# Patient Record
Sex: MALE | Race: WHITE | NOT HISPANIC OR LATINO | Employment: FULL TIME | URBAN - METROPOLITAN AREA
[De-identification: names, ages, dates, MRNs, and addresses within clinical notes are randomized per-mention and may not be internally consistent; named-entity substitution may affect disease eponyms.]

---

## 2019-01-07 ENCOUNTER — HOSPITAL ENCOUNTER (EMERGENCY)
Facility: CLINIC | Age: 49
Discharge: HOME | End: 2019-01-07
Attending: EMERGENCY MEDICINE | Admitting: EMERGENCY MEDICINE
Payer: COMMERCIAL

## 2019-01-07 VITALS
HEIGHT: 71 IN | DIASTOLIC BLOOD PRESSURE: 80 MMHG | OXYGEN SATURATION: 99 % | BODY MASS INDEX: 25.2 KG/M2 | RESPIRATION RATE: 12 BRPM | HEART RATE: 77 BPM | WEIGHT: 180 LBS | SYSTOLIC BLOOD PRESSURE: 121 MMHG | TEMPERATURE: 98.3 F

## 2019-01-07 DIAGNOSIS — R00.0 TACHYCARDIA: ICD-10-CM

## 2019-01-07 DIAGNOSIS — R42 DIZZINESS: ICD-10-CM

## 2019-01-07 LAB
ANION GAP SERPL CALCULATED.3IONS-SCNC: 8 MMOL/L (ref 3–14)
BASOPHILS # BLD AUTO: 0 10E9/L (ref 0–0.2)
BASOPHILS NFR BLD AUTO: 0.3 %
BUN SERPL-MCNC: 20 MG/DL (ref 7–30)
CALCIUM SERPL-MCNC: 8.6 MG/DL (ref 8.5–10.1)
CHLORIDE SERPL-SCNC: 108 MMOL/L (ref 94–109)
CO2 SERPL-SCNC: 27 MMOL/L (ref 20–32)
CREAT SERPL-MCNC: 0.91 MG/DL (ref 0.66–1.25)
DIFFERENTIAL METHOD BLD: ABNORMAL
EOSINOPHIL # BLD AUTO: 0.1 10E9/L (ref 0–0.7)
EOSINOPHIL NFR BLD AUTO: 1.2 %
ERYTHROCYTE [DISTWIDTH] IN BLOOD BY AUTOMATED COUNT: 12.3 % (ref 10–15)
GFR SERPL CREATININE-BSD FRML MDRD: >90 ML/MIN/{1.73_M2}
GLUCOSE SERPL-MCNC: 105 MG/DL (ref 70–99)
HCT VFR BLD AUTO: 33.6 % (ref 40–53)
HGB BLD-MCNC: 11 G/DL (ref 13.3–17.7)
IMM GRANULOCYTES # BLD: 0 10E9/L (ref 0–0.4)
IMM GRANULOCYTES NFR BLD: 0.3 %
INTERPRETATION ECG - MUSE: NORMAL
LYMPHOCYTES # BLD AUTO: 0.7 10E9/L (ref 0.8–5.3)
LYMPHOCYTES NFR BLD AUTO: 12.3 %
MCH RBC QN AUTO: 31.2 PG (ref 26.5–33)
MCHC RBC AUTO-ENTMCNC: 32.7 G/DL (ref 31.5–36.5)
MCV RBC AUTO: 95 FL (ref 78–100)
MONOCYTES # BLD AUTO: 0.4 10E9/L (ref 0–1.3)
MONOCYTES NFR BLD AUTO: 6.6 %
NEUTROPHILS # BLD AUTO: 4.6 10E9/L (ref 1.6–8.3)
NEUTROPHILS NFR BLD AUTO: 79.3 %
NRBC # BLD AUTO: 0 10*3/UL
NRBC BLD AUTO-RTO: 0 /100
PLATELET # BLD AUTO: 201 10E9/L (ref 150–450)
POTASSIUM SERPL-SCNC: 4.1 MMOL/L (ref 3.4–5.3)
RBC # BLD AUTO: 3.53 10E12/L (ref 4.4–5.9)
SODIUM SERPL-SCNC: 143 MMOL/L (ref 133–144)
TROPONIN I SERPL-MCNC: <0.015 UG/L (ref 0–0.04)
WBC # BLD AUTO: 5.8 10E9/L (ref 4–11)

## 2019-01-07 PROCEDURE — 96360 HYDRATION IV INFUSION INIT: CPT

## 2019-01-07 PROCEDURE — 96361 HYDRATE IV INFUSION ADD-ON: CPT

## 2019-01-07 PROCEDURE — 99284 EMERGENCY DEPT VISIT MOD MDM: CPT | Mod: 25

## 2019-01-07 PROCEDURE — 84484 ASSAY OF TROPONIN QUANT: CPT | Performed by: EMERGENCY MEDICINE

## 2019-01-07 PROCEDURE — 85025 COMPLETE CBC W/AUTO DIFF WBC: CPT | Performed by: EMERGENCY MEDICINE

## 2019-01-07 PROCEDURE — 80048 BASIC METABOLIC PNL TOTAL CA: CPT | Performed by: EMERGENCY MEDICINE

## 2019-01-07 PROCEDURE — 93005 ELECTROCARDIOGRAM TRACING: CPT

## 2019-01-07 PROCEDURE — 25000128 H RX IP 250 OP 636: Performed by: EMERGENCY MEDICINE

## 2019-01-07 RX ORDER — SODIUM CHLORIDE 9 MG/ML
1000 INJECTION, SOLUTION INTRAVENOUS CONTINUOUS
Status: DISCONTINUED | OUTPATIENT
Start: 2019-01-07 | End: 2019-01-07 | Stop reason: HOSPADM

## 2019-01-07 RX ADMIN — SODIUM CHLORIDE 1000 ML: 9 INJECTION, SOLUTION INTRAVENOUS at 16:27

## 2019-01-07 ASSESSMENT — ENCOUNTER SYMPTOMS: PALPITATIONS: 1

## 2019-01-07 ASSESSMENT — MIFFLIN-ST. JEOR: SCORE: 1718.6

## 2019-01-07 NOTE — ED AVS SNAPSHOT
[facility] Emergency Department  [address]  [facility] [address]  Phone:  [phone]  Fax:  [phone]                                    Sherif Moraes   MRN: [id number]    Department:  [facility] Emergency Department   Date of Visit:  1/7/2019           After Visit Summary Signature Page    I have received my discharge instructions, and my questions have been answered. I have discussed any challenges I see with this plan with the nurse or doctor.    ..........................................................................................................................................  Patient/Patient Representative Signature      ..........................................................................................................................................  Patient Representative Print Name and Relationship to Patient    ..................................................               ................................................  Date                                   Time    ..........................................................................................................................................  Reviewed by Signature/Title    ...................................................              ..............................................  Date                                               Time          22EPIC Rev 08/18

## 2019-01-07 NOTE — ED PROVIDER NOTES
"  History     Chief Complaint:  Tachycardia      HPI   Sherif Moraes is a 48 year old male who presents to the emergency department today for evaluation of tachycardia. The patient reports four hours prior to arrival he went for a run, and noticed at the end of the run that he felt extreme exhaustion that was different from usual. He felt his heart was racing, and some anxiety. He did try eating yogurt, and drinking water. His heart rate stayed around 110 for quite a while, and has since gone down in the past 1/2 hour. Due to these symptoms he presented to the emergency department today. Upon arrival he says he feels out of it. He reports the same situation and experience 8 years ago, and he presented to the emergency department and was found to have low potassium. He reports he did travel to the [place] 1 month prior.     Allergies:  No Known Drug Allergies        Medications:    Cyclobenzaprine HCl (FLEXERIL PO)  diazepam (VALIUM) 5 MG tablet  HydrOXYzine Pamoate (VISTARIL PO)  methocarbamol (ROBAXIN) 500 MG tablet       Past Medical History:    History reviewed. No pertinent past medical history.       Past Surgical History:    History reviewed. No pertinent past surgical history.       Family History:    History reviewed. No pertinent family history.        Social History:  The patient was accompanied to the ED by wife.  Smoking Status: Never Smoker  Smokeless Tobacco: Never Used  Alcohol Use: No   Marital Status:   [2]       Review of Systems   Cardiovascular: Positive for palpitations.   All other systems reviewed and are negative.        Physical Exam   First Vitals:  BP: (!) 133/93  Pulse: 96  Heart Rate: 96  Temp: 98.3  F (36.8  C)  Resp: 16  Height: 180.3 cm (5' 11\")  Weight: 81.6 kg (180 lb)  SpO2: 100 %      Physical Exam  General: The patient is alert, in no respiratory distress.    HENT: Mucous membranes moist.    Cardiovascular: Regular rate and rhythm. Good pulses in all four " extremities. Normal capillary refill and skin turgor.     Respiratory: Lungs are clear. No nasal flaring. No retractions. No wheezing, no crackles.    Gastrointestinal: Abdomen soft. No guarding, no rebound. No palpable hernias.     Musculoskeletal: No gross deformity.     Skin: No rashes or petechiae.     Neurologic: The patient is alert and oriented x3. GCS 15. No testable cranial nerve deficit. Follows commands with clear and appropriate speech. Gives appropriate answers. Good strength in all extremities. No gross neurologic deficit. Gross sensation intact. Pupils are round and reactive. No meningismus.     Lymphatic: No cervical adenopathy. No lower extremity swelling.    Psychiatric: The patient is non-tearful.   Emergency Department Course     ECG:  Indication: tachycardia  Completed at 1429.  Read at 1617.   Normal sinus rhythm. Normal ECG.  Rate 98 bpm. NC interval 170. QRS duration 82. QT/QTc 344/439. P-R-T axes 59 37 32.     Laboratory:  Laboratory findings were communicated with the patient who voiced understanding of the findings.    CBC: WBC 5.8, HGB 11.0 (L),    BMP: Glucose 105 (H) o/w WNL (Creatinine 0.91)   Troponin (Collected 1550): <0.015         Interventions:  1627 NS Bolus 1,000mL IV        Emergency Department Course:  Nursing notes and vitals reviewed.  1617: I performed an exam of the patient as documented above.   IV was inserted and blood was drawn for laboratory testing, results above.   EKG obtained in the ED, see results above.    1745 Patient rechecked and updated.    Findings and plan explained to the Patient. Patient discharged home with instructions regarding supportive care, medications, and reasons to return. The importance of close follow-up was reviewed.   I personally reviewed the laboratory  results with the Patient and answered all related questions prior to  discharge.   Impression & Plan      Medical Decision Making:  Sherif Moraes is a 48 year old male who reports  with starting his workout back up again he noticed his heart was beating fast. He thinks it was in the 110 range. This doesn't sound like SVT. He was lightheaded and had a history of anxiety. I think this is multifactorial including partial dehydration. Id did note on labs his hemoglobin slight low of 11, but hasn't had any bleeding or bloody stools. He said it has been this low in the past. I did give him a bag of fluid. He was feeling much better, and was discharged home with instruction to follow up with primary care. I did discuss the need to have a repeat hemoglobin, but I don't feel like he has a GI bleed or other symptoms. His symptoms resolved while in emergency department.     Diagnosis:    ICD-10-CM    1. Tachycardia R00.0    2. Dizziness R42        Disposition:  Discharged to home.     Scribe Disclosure:  I, Ana Graves, am serving as a scribe at 4:18 PM on 1/7/2019 to document services personally performed by Zhao Rutledge MD based on my observations and the provider's statements to me.    Ana Graves  1/7/2019   [facility] EMERGENCY DEPARTMENT       Zhao Rutledge MD  01/10/19 0615

## 2019-01-07 NOTE — ED TRIAGE NOTES
Patient reports he ran 2.5 miles this afternoon at about 1230. His heart rate has stayed up since then, 110 in triage. His normal heart rate is 56-60. He is lightheaded.

## 2019-01-07 NOTE — DISCHARGE INSTRUCTIONS
Discharge Instructions  Palpitations    Palpitations are an unusual awareness of your heartbeat. People often describe this as the heart skipping, fluttering, racing, irregular, or pounding. At this time, your doctor has found no signs that your palpitations are due to a serious or life-threatening condition. However, sometimes there is a serious problem that does not show up right away. It is important that you follow up with your doctor within 1 week, or as directed by your doctor today, to check for other serious problems. You may need more blood tests, a stress test, heart monitoring, or other tests.    Palpitations can be caused by caffeine, cigarettes, diet pills, energy drinks or supplements, other stimulants, and medications and street drugs. They can also be caused by anxiety, hormone conditions such as high thyroid, and other medical conditions. Sometimes they are a sign of abnormal rhythm in the heart, so you may need your heart checked.    Return to the Emergency Department if:  You get chest pain or tightness.  You are short of breath.  You get very weak or tired.  You pass out or faint.  Your heart rate is over 120 beats per minute for more than 10 minutes while you are resting.  You have any new symptoms, like fever, cough, numb legs, or you cough up blood.  You have anything else that worries you.    What can I do to help myself?  Fill any prescriptions the doctor gave you and take them right away.   Follow your doctor?s instructions about the prescription medicines you are on. Sometimes the doctor may tell you to stop taking a medicine or change the dose.  If you smoke, this may be a good time to quit! The less you can smoke, the better.  Do not use energy drinks, diet pills, or stimulants. Limit your use of caffeine.    Follow up with your doctor:  Within 1 week, or sooner if instructed.  If you keep having palpitations.  If you need help to quit smoking.  If you were given a prescription for  medicine here today, be sure to read all of the information (including the package insert) that comes with your prescription.  This will include important information about the medicine, its side effects, and any warnings that you need to know about.  The pharmacist who fills the prescription can provide more information and answer questions you may have about the medicine.  If you have questions or concerns that the pharmacist cannot address, please call or return to the Emergency Department.         Opioid Medication Information    Pain medications are among the most commonly prescribed medicines, so we are including this information for all our patients. If you did not receive pain medication or get a prescription for pain medicine, you can ignore it.     You may have been given a prescription for an opioid (narcotic) pain medicine and/or have received a pain medicine while here in the Emergency Department. These medicines can make you drowsy or impaired. You must not drive, operate dangerous equipment, or engage in any other dangerous activities while taking these medications. If you drive while taking these medications, you could be arrested for DUI, or driving under the influence. Do not drink any alcohol while you are taking these medications.     Opioid pain medications can cause addiction. If you have a history of chemical dependency of any type, you are at a higher risk of becoming addicted to pain medications.  Only take these prescribed medications to treat your pain when all other options have been tried. Take it for as short a time and as few doses as possible. Store your pain pills in a secure place, as they are frequently stolen and provide a dangerous opportunity for children or visitors in your house to start abusing these powerful medications. We will not replace any lost or stolen medicine.  As soon as your pain is better, you should flush all your remaining medication.     Many prescription pain  medications contain Tylenol  (acetaminophen), including Vicodin , Tylenol #3 , Norco , Lortab , and Percocet .  You should not take any extra pills of Tylenol  if you are using these prescription medications or you can get very sick.  Do not ever take more than 3000 mg of acetaminophen in any 24 hour period.    All opioids tend to cause constipation. Drink plenty of water and eat foods that have a lot of fiber, such as fruits, vegetables, prune juice, apple juice and high fiber cereal.  Take a laxative if you don?t move your bowels at least every other day. Miralax , Milk of Magnesia, Colace , or Senna  can be used to keep you regular.      Remember that you can always come back to the Emergency Department if you are not able to see your regular doctor in the amount of time listed above, if you get any new symptoms, or if there is anything that worries you.

## 2021-12-02 ENCOUNTER — APPOINTMENT (OUTPATIENT)
Dept: GENERAL RADIOLOGY | Facility: CLINIC | Age: 51
End: 2021-12-02
Attending: EMERGENCY MEDICINE
Payer: COMMERCIAL

## 2021-12-02 ENCOUNTER — HOSPITAL ENCOUNTER (EMERGENCY)
Facility: CLINIC | Age: 51
Discharge: HOME | End: 2021-12-02
Attending: EMERGENCY MEDICINE | Admitting: EMERGENCY MEDICINE
Payer: COMMERCIAL

## 2021-12-02 VITALS
WEIGHT: 180 LBS | OXYGEN SATURATION: 97 % | DIASTOLIC BLOOD PRESSURE: 65 MMHG | HEART RATE: 94 BPM | TEMPERATURE: 98.9 F | SYSTOLIC BLOOD PRESSURE: 124 MMHG | BODY MASS INDEX: 25.1 KG/M2 | RESPIRATION RATE: 18 BRPM

## 2021-12-02 DIAGNOSIS — U07.1 INFECTION DUE TO 2019 NOVEL CORONAVIRUS: ICD-10-CM

## 2021-12-02 LAB
ANION GAP SERPL CALCULATED.3IONS-SCNC: 6 MMOL/L (ref 3–14)
BASOPHILS # BLD AUTO: 0 10E3/UL (ref 0–0.2)
BASOPHILS NFR BLD AUTO: 1 %
BUN SERPL-MCNC: 11 MG/DL (ref 7–30)
CALCIUM SERPL-MCNC: 8.8 MG/DL (ref 8.5–10.1)
CHLORIDE BLD-SCNC: 106 MMOL/L (ref 94–109)
CO2 SERPL-SCNC: 26 MMOL/L (ref 20–32)
CREAT SERPL-MCNC: 0.95 MG/DL (ref 0.66–1.25)
EOSINOPHIL # BLD AUTO: 0 10E3/UL (ref 0–0.7)
EOSINOPHIL NFR BLD AUTO: 0 %
ERYTHROCYTE [DISTWIDTH] IN BLOOD BY AUTOMATED COUNT: 13.1 % (ref 10–15)
GFR SERPL CREATININE-BSD FRML MDRD: >90 ML/MIN/1.73M2
GLUCOSE BLD-MCNC: 103 MG/DL (ref 70–99)
HCT VFR BLD AUTO: 40.7 % (ref 40–53)
HGB BLD-MCNC: 13.4 G/DL (ref 13.3–17.7)
IMM GRANULOCYTES # BLD: 0 10E3/UL
IMM GRANULOCYTES NFR BLD: 0 %
LYMPHOCYTES # BLD AUTO: 0.7 10E3/UL (ref 0.8–5.3)
LYMPHOCYTES NFR BLD AUTO: 17 %
MCH RBC QN AUTO: 31.2 PG (ref 26.5–33)
MCHC RBC AUTO-ENTMCNC: 32.9 G/DL (ref 31.5–36.5)
MCV RBC AUTO: 95 FL (ref 78–100)
MONOCYTES # BLD AUTO: 0.7 10E3/UL (ref 0–1.3)
MONOCYTES NFR BLD AUTO: 19 %
NEUTROPHILS # BLD AUTO: 2.5 10E3/UL (ref 1.6–8.3)
NEUTROPHILS NFR BLD AUTO: 63 %
NRBC # BLD AUTO: 0 10E3/UL
NRBC BLD AUTO-RTO: 0 /100
PLATELET # BLD AUTO: 207 10E3/UL (ref 150–450)
POTASSIUM BLD-SCNC: 3.7 MMOL/L (ref 3.4–5.3)
RBC # BLD AUTO: 4.29 10E6/UL (ref 4.4–5.9)
SARS-COV-2 RNA RESP QL NAA+PROBE: POSITIVE
SODIUM SERPL-SCNC: 138 MMOL/L (ref 133–144)
TROPONIN I SERPL HS-MCNC: 25 NG/L
WBC # BLD AUTO: 4 10E3/UL (ref 4–11)

## 2021-12-02 PROCEDURE — 80048 BASIC METABOLIC PNL TOTAL CA: CPT | Performed by: EMERGENCY MEDICINE

## 2021-12-02 PROCEDURE — 71045 X-RAY EXAM CHEST 1 VIEW: CPT

## 2021-12-02 PROCEDURE — 85025 COMPLETE CBC W/AUTO DIFF WBC: CPT | Performed by: EMERGENCY MEDICINE

## 2021-12-02 PROCEDURE — 93005 ELECTROCARDIOGRAM TRACING: CPT

## 2021-12-02 PROCEDURE — 84484 ASSAY OF TROPONIN QUANT: CPT | Performed by: EMERGENCY MEDICINE

## 2021-12-02 PROCEDURE — 87635 SARS-COV-2 COVID-19 AMP PRB: CPT | Performed by: EMERGENCY MEDICINE

## 2021-12-02 PROCEDURE — 99285 EMERGENCY DEPT VISIT HI MDM: CPT

## 2021-12-02 PROCEDURE — 36415 COLL VENOUS BLD VENIPUNCTURE: CPT | Performed by: EMERGENCY MEDICINE

## 2021-12-02 PROCEDURE — C9803 HOPD COVID-19 SPEC COLLECT: HCPCS

## 2021-12-03 LAB
ATRIAL RATE - MUSE: 81 BPM
DIASTOLIC BLOOD PRESSURE - MUSE: NORMAL MMHG
INTERPRETATION ECG - MUSE: NORMAL
P AXIS - MUSE: 51 DEGREES
PR INTERVAL - MUSE: 160 MS
QRS DURATION - MUSE: 82 MS
QT - MUSE: 370 MS
QTC - MUSE: 429 MS
R AXIS - MUSE: 7 DEGREES
SYSTOLIC BLOOD PRESSURE - MUSE: NORMAL MMHG
T AXIS - MUSE: 7 DEGREES
VENTRICULAR RATE- MUSE: 81 BPM

## 2021-12-03 ASSESSMENT — ENCOUNTER SYMPTOMS
RHINORRHEA: 0
ACTIVITY CHANGE: 1
NAUSEA: 0
FEVER: 1
CONFUSION: 0
SHORTNESS OF BREATH: 0
FATIGUE: 1
DYSURIA: 0
VOMITING: 0
MYALGIAS: 1
ABDOMINAL PAIN: 0
PALPITATIONS: 0
COUGH: 1
APPETITE CHANGE: 1

## 2021-12-03 NOTE — ED PROVIDER NOTES
History     Chief Complaint:  Chest Pain and Covid Concern      HPI   Sherif Moraes is a 51 year old male who presents with 5 days of body aches, headaches, low-grade fevers, fatigue and malaise as well as loss of his sense of taste and smell.  The patient denies any significant nausea vomiting or diarrhea.  No rash.  He notes a mild cough but no significant shortness of breath.  He notes that tonight he developed a mild chest discomfort over the right anterolateral aspect of the chest wall which has now resolved.  He reports that he took a home COVID test 2 days after the onset of his symptoms but this was negative.  He has been using Tylenol and ibuprofen as needed for symptom control.  He denies any other symptoms at this time.    Review of Systems   Constitutional: Positive for activity change, appetite change, fatigue and fever.   HENT: Negative for congestion and rhinorrhea.    Respiratory: Positive for cough. Negative for shortness of breath.    Cardiovascular: Positive for chest pain. Negative for palpitations and leg swelling.   Gastrointestinal: Negative for abdominal pain, nausea and vomiting.   Genitourinary: Negative for dysuria.   Musculoskeletal: Positive for myalgias.   Skin: Negative for rash.   Neurological: Negative for syncope.   Psychiatric/Behavioral: Negative for confusion.   All other systems reviewed and are negative.        Allergies:  No Known Allergies      Medications:    Cyclobenzaprine   diazepam   Hydroxyzine Pamoate   methocarbamol         Past Medical History:    Epididymitis  Nondisplaced fracture of middle phalanx of left lesser toe  Sebaceous cyst  Hyperkalemia  Acute upper respiratory infection  Allergic rhinitis  Scarlet fever  Palpitations       Social History:  Nonsmoker  Patient presents to the ED alone  No known ill contacts. Patient works from home.    Physical Exam     Patient Vitals for the past 24 hrs:   BP Temp Temp src Pulse Resp SpO2 Weight   12/02/21 1958 (!)  137/100 98.9  F (37.2  C) Temporal 106 16 99 % 81.6 kg (180 lb)       Physical Exam  General: Well appearing, nontoxic. Resting comfortably  Head:  Scalp, face, and head appear normal  Eyes:  Pupils are equal, round    Conjunctivae non-injected and sclerae white  ENT:    The external nose is normal    Pinnae are normal  Neck:  Normal range of motion    There is no rigidity noted    Trachea is in the midline  CV:  Regular rate and rhythm     Normal S1/S2, no S3/S4    No murmur or rub. Radial pulses 2+ bilaterally.  Resp:  Lungs are clear and equal bilaterally  There is no tachypnea    No increased work of breathing    No rales, wheezing, or rhonchi  GI:  No distention. No upper abdominal tenderness to palpation   MS:  Normal muscular tone    Symmetric motor strength    No lower extremity edema  Skin:  No rash or acute skin lesions noted  Neuro: Awake and alert  Speech is normal and fluent  Moves all extremities spontaneously  Psych:  Normal affect. Appropriate interactions.      Emergency Department Course   ECG:  ECG taken at 2008, ECG read at 2158  Normal sinus rhythm  Normal ECG   Rate 81 bpm. UT interval 160 ms. QRS duration 82 ms. QT/QTc 370/429 ms. P-R-T axes 51 7 7.     Imaging:  XR Chest Port 1 View   Final Result   IMPRESSION: Negative chest.          Laboratory:  Labs Ordered and Resulted from Time of ED Arrival to Time of ED Departure   BASIC METABOLIC PANEL - Abnormal       Result Value    Sodium 138      Potassium 3.7      Chloride 106      Carbon Dioxide (CO2) 26      Anion Gap 6      Urea Nitrogen 11      Creatinine 0.95      Calcium 8.8      Glucose 103 (*)     GFR Estimate >90     COVID-19 VIRUS (CORONAVIRUS) BY PCR - Abnormal    SARS CoV2 PCR Positive (*)    CBC WITH PLATELETS AND DIFFERENTIAL - Abnormal    WBC Count 4.0      RBC Count 4.29 (*)     Hemoglobin 13.4      Hematocrit 40.7      MCV 95      MCH 31.2      MCHC 32.9      RDW 13.1      Platelet Count 207      % Neutrophils 63      %  Lymphocytes 17      % Monocytes 19      % Eosinophils 0      % Basophils 1      % Immature Granulocytes 0      NRBCs per 100 WBC 0      Absolute Neutrophils 2.5      Absolute Lymphocytes 0.7 (*)     Absolute Monocytes 0.7      Absolute Eosinophils 0.0      Absolute Basophils 0.0      Absolute Immature Granulocytes 0.0      Absolute NRBCs 0.0     TROPONIN I - Normal    Troponin I High Sensitivity 25         Emergency Department Course:    Reviewed:  I reviewed nursing notes, vitals, past history and care everywhere    Assessments:  2212 I obtained history and examined the patient as noted above.   2330 I rechecked the patient and explained findings.       Disposition:  The patient was discharged to home.    Impression & Plan      Medical Decision Making:  Sherif Moraes is a 51 year old male who presents to the emergency department today with fever, cough, malaise, and symptoms of likely viral syndrome. A broad ddx was considered including viral and bacterial causes of infection including URI, pharyngitis, bronchitis, pneumonia, influenza, COVID-19, OM, Strep pharyngitis, sinus infection, among others. Clinically the patient is well appearing without increased work of breathing, respiratory distress, hypoxia, signs of ARDS or other serious decompensation or complication. Clinical signs and symptoms are not consistent with meningitis or sepsis. The patient was tested for SARS-CoV-2 virus infection and this did return positive. CXR was obtained and negative for acute findings or evidence of pneumonia. I recommended supportive care for treatment of COVID-19. Ibuprofen/Tylenol for fever control. Good oral fluid intake. Discussed the importance of home quarantine and CDC guidelines on self-quarantine were provided as part of discharge instructions. I discussed with the patient how to sign up online with the [facility] to obtain monoclonal antibody treatment.  No indication for hospitalization at this time.  Return precautions were discussed with patient. The patient's questions were answered and the patient was agreeable with discharge.  He reports he has a pulse oximeter at home and will continue to monitor his oxygen saturations.        Covid-19  Sherif Moraes was evaluated during a global COVID-19 pandemic, which necessitated consideration that the patient might be at risk for infection with the SARS-CoV-2 virus that causes COVID-19.   Applicable protocols for evaluation were followed during the patient's care.   COVID-19 was considered as part of the patient's evaluation. The plan for testing is:  a test was obtained during this visit. The test result is positive.  a test was obtained at a previous visit and reviewed & considered today.    Diagnosis:    ICD-10-CM    1. Infection due to 2019 novel coronavirus  U07.1        Discharge Medications:  Discharge Medication List as of 12/2/2021 11:15 PM            Scribe Disclosure:  Aleyda HOWARD, am serving as a scribe at 9:41 PM on 12/2/2021 to document services personally performed by Marito Vizcarra MD based on my observations and the provider's statements to me.      Marito Vizcarra MD  12/03/21 0012

## 2021-12-03 NOTE — DISCHARGE INSTRUCTIONS
Information about medications used to treat COVID-19:  [email or web address]    Website to apply to receive Monoclonal Antibody treatment for COVID-19:  [email or web address]        Discharge Instructions  COVID-19    COVID-19 is the disease caused by a new coronavirus. The virus spreads from person-to-person primarily by droplets when an infected person coughs or sneezes and the droplets are then breathed in by another person. There are tests available to diagnose COVID-19. You may have been diagnosed with COVID, may be being tested for COVID and have a pending test result, or may have been exposed to COVID.    Symptoms of COVID-19  Many people have no symptoms or mild symptoms.  Symptoms may usually appear 4 to 5 days (up to 14 days) after contact with a person with COVID-19. Some people will get severe symptoms and pneumonia. Usual symptoms are:     ? Fever  ? Cough  ? Trouble breathing    Less common symptoms are: Headache, body aches, sore throat, sneezing, diarrhea, loss of taste or smell.    Isolation and Quarantine    You may have been seen because you have symptoms, had an exposure, or had some other concern about possible COVID. The best way to stop the spread of the virus is to avoid contact with others.    Isolation refers to sick people staying away from people who are not sick. A person in quarantine is limiting activity because they were exposed and are waiting to see if they might become sick.    If you test positive for COVID, you should stay home (isolation) for at least 10 days after your symptoms began, and for 24 hours with no fever and improvement of symptoms--whichever is longer. (Your fever should be gone for 24 hours without using fever-reducing medicine). If you have no symptoms, you should stay home (isolation) for 10 days from the day of the test. If you have been vaccinated for COVID, the vaccination  will not cause you to test positive so a positive test result generally is a  true positive .    For example, if you have a fever and cough for 6 days, you need to stay home 4 more days with no fever for a total of 10 days. Or, if you have a fever and cough for 10 days, you need to stay home one more day with no fever for a total of 11 days.    If you have a high-risk exposure to COVID (you spent 15 minutes or more within six feet of somebody who has COVID), you should stay home (quarantine) for 14 days, unless you are vaccinated. Even if you test negative for COVID, the CDC recommends a 14-day quarantine from the time of your last exposure to that individual (unless you are vaccinated). There are options for a shortened (<14 day quarantine) you can review at:  [email or web address]    If you live in the same house as somebody with COVID and cannot separate from them, you will need to quarantine for 14-days after that person's isolation (infectious) period. That means that you may need to quarantine for 24-days after that person became symptomatic/ill.    If you are vaccinated and do not develop symptoms, you do not need to quarantine after exposure.    If you have symptoms but a negative test, you should stay at home until you are symptom-free and without fever for 24 hours, using the same judgment you would for when it is safe to return to work/school from strep throat, influenza, or the common cold. If you worsen, you should consider being re-evaluated.    If you are being tested for COVID because of symptoms and your test is pending, you should stay home until you know your test result.    If I have COVID, how should I protect myself and others?    Do not go to work or school. Have a friend or relative do your shopping. Do not use public transportation (bus, train) or ridesharing (Lyft, Uber).    Separate yourself from other people in your home. As much as possible,  you should stay in one room and away from other people in your home. Also, use a separate bathroom, if possible. Avoid handling pets or other animals while sick.     Wear a facemask if you need to be around other people and cover your mouth and nose with a tissue when you cough or sneeze.     Avoid sharing personal household items. You should not share dishes, drinking glasses, forks/knives/spoons, towels, or bedding with other people in your home. After using these items, they should be washed with soap and water. Clean parts of your home that are touched often (doorknobs, faucets, countertops, etc.) daily.     Wash your hands often with soap and water for at least 20 seconds or use an alcohol-based hand  containing at least 60% alcohol.     Avoid touching your face.    Treat your symptoms. You can take Acetaminophen (Tylenol) to treat body aches and fever as needed for comfort. Ibuprofen (Advil or Motrin) can be used as well if you still have symptoms after taking Tylenol. Drink fluids. Rest.    Watch for worsening symptoms such as shortness of breath/difficulty breathing or very severe weakness.    Employers/workplaces are being asked by the Centers for Disease Control (CDC) to not request notes/documentation for you to return to work or prove that you were ill. You may choose to show your employer this paperwork. Also, repeat testing should not be required to return to work.    Exercise/Sports in rare cases, COVID could affect your heart in a way that makes exercise or participation in sports dangerous.    If you have a mild COVID illness (fever, cough, sore throat, and similar symptoms but no difficulty breathing or abnormalities of the lung): After your COVID symptoms have resolved, wait 14-days before returning to activity.  If you have more than a mild illness (meaning that you have problems with your breathing or lungs) or if you participate in competitive or strenuous activity or have a history of  heart disease: Please see your primary doctor/provider prior to return to activity/competition.    Antibody treatments are available for patients with mild to moderate COVID illness in order to prevent severe illness. In general, only patients with risk factors for severe illness are eligible for treatment. For more information, to see if you are eligible, and to find treatment, go to the [facility]:  [email or web address]     Return to the Emergency Department if:    If you are developing worsening breathing, shortness of breath, or feel worse you should seek medical attention.  If you are uncertain, contact your health care provider/clinic. If you need emergency medical attention, call 911 and tell them you have been ill.

## 2022-02-07 ENCOUNTER — HOSPITAL ENCOUNTER (EMERGENCY)
Facility: CLINIC | Age: 52
Discharge: HOME | End: 2022-02-08
Attending: EMERGENCY MEDICINE | Admitting: EMERGENCY MEDICINE
Payer: COMMERCIAL

## 2022-02-07 DIAGNOSIS — R07.89 ATYPICAL CHEST PAIN: ICD-10-CM

## 2022-02-07 LAB
ALBUMIN SERPL-MCNC: 3.6 G/DL (ref 3.4–5)
ALP SERPL-CCNC: 50 U/L (ref 40–150)
ALT SERPL W P-5'-P-CCNC: 23 U/L (ref 0–70)
ANION GAP SERPL CALCULATED.3IONS-SCNC: 5 MMOL/L (ref 3–14)
AST SERPL W P-5'-P-CCNC: 18 U/L (ref 0–45)
BASOPHILS # BLD AUTO: 0.1 10E3/UL (ref 0–0.2)
BASOPHILS NFR BLD AUTO: 1 %
BILIRUB SERPL-MCNC: 0.3 MG/DL (ref 0.2–1.3)
BUN SERPL-MCNC: 19 MG/DL (ref 7–30)
CALCIUM SERPL-MCNC: 8.8 MG/DL (ref 8.5–10.1)
CHLORIDE BLD-SCNC: 108 MMOL/L (ref 94–109)
CO2 SERPL-SCNC: 27 MMOL/L (ref 20–32)
CREAT SERPL-MCNC: 1.06 MG/DL (ref 0.66–1.25)
D DIMER PPP FEU-MCNC: <0.27 UG/ML FEU (ref 0–0.5)
EOSINOPHIL # BLD AUTO: 0.1 10E3/UL (ref 0–0.7)
EOSINOPHIL NFR BLD AUTO: 2 %
ERYTHROCYTE [DISTWIDTH] IN BLOOD BY AUTOMATED COUNT: 13 % (ref 10–15)
GFR SERPL CREATININE-BSD FRML MDRD: 86 ML/MIN/1.73M2
GLUCOSE BLD-MCNC: 103 MG/DL (ref 70–99)
HCT VFR BLD AUTO: 37.6 % (ref 40–53)
HGB BLD-MCNC: 12.4 G/DL (ref 13.3–17.7)
IMM GRANULOCYTES # BLD: 0 10E3/UL
IMM GRANULOCYTES NFR BLD: 0 %
LYMPHOCYTES # BLD AUTO: 1.6 10E3/UL (ref 0.8–5.3)
LYMPHOCYTES NFR BLD AUTO: 25 %
MCH RBC QN AUTO: 31.7 PG (ref 26.5–33)
MCHC RBC AUTO-ENTMCNC: 33 G/DL (ref 31.5–36.5)
MCV RBC AUTO: 96 FL (ref 78–100)
MONOCYTES # BLD AUTO: 0.6 10E3/UL (ref 0–1.3)
MONOCYTES NFR BLD AUTO: 9 %
NEUTROPHILS # BLD AUTO: 4.1 10E3/UL (ref 1.6–8.3)
NEUTROPHILS NFR BLD AUTO: 63 %
NRBC # BLD AUTO: 0 10E3/UL
NRBC BLD AUTO-RTO: 0 /100
PLATELET # BLD AUTO: 264 10E3/UL (ref 150–450)
POTASSIUM BLD-SCNC: 3.3 MMOL/L (ref 3.4–5.3)
PROT SERPL-MCNC: 7.1 G/DL (ref 6.8–8.8)
RBC # BLD AUTO: 3.91 10E6/UL (ref 4.4–5.9)
SODIUM SERPL-SCNC: 140 MMOL/L (ref 133–144)
TROPONIN I SERPL HS-MCNC: 40 NG/L
WBC # BLD AUTO: 6.6 10E3/UL (ref 4–11)

## 2022-02-07 PROCEDURE — 80053 COMPREHEN METABOLIC PANEL: CPT | Performed by: EMERGENCY MEDICINE

## 2022-02-07 PROCEDURE — 84484 ASSAY OF TROPONIN QUANT: CPT | Performed by: EMERGENCY MEDICINE

## 2022-02-07 PROCEDURE — 85379 FIBRIN DEGRADATION QUANT: CPT | Performed by: EMERGENCY MEDICINE

## 2022-02-07 PROCEDURE — 85025 COMPLETE CBC W/AUTO DIFF WBC: CPT | Performed by: EMERGENCY MEDICINE

## 2022-02-07 PROCEDURE — 99285 EMERGENCY DEPT VISIT HI MDM: CPT | Mod: 25

## 2022-02-07 PROCEDURE — 93005 ELECTROCARDIOGRAM TRACING: CPT

## 2022-02-07 PROCEDURE — 36415 COLL VENOUS BLD VENIPUNCTURE: CPT | Performed by: EMERGENCY MEDICINE

## 2022-02-07 ASSESSMENT — ENCOUNTER SYMPTOMS
FEVER: 0
SHORTNESS OF BREATH: 0
COUGH: 0

## 2022-02-08 ENCOUNTER — APPOINTMENT (OUTPATIENT)
Dept: GENERAL RADIOLOGY | Facility: CLINIC | Age: 52
End: 2022-02-08
Attending: EMERGENCY MEDICINE
Payer: COMMERCIAL

## 2022-02-08 VITALS
BODY MASS INDEX: 25.64 KG/M2 | WEIGHT: 183.86 LBS | RESPIRATION RATE: 15 BRPM | TEMPERATURE: 98.6 F | OXYGEN SATURATION: 98 % | SYSTOLIC BLOOD PRESSURE: 135 MMHG | DIASTOLIC BLOOD PRESSURE: 91 MMHG | HEART RATE: 71 BPM

## 2022-02-08 LAB
ATRIAL RATE - MUSE: 71 BPM
DIASTOLIC BLOOD PRESSURE - MUSE: NORMAL MMHG
INTERPRETATION ECG - MUSE: NORMAL
P AXIS - MUSE: 44 DEGREES
PR INTERVAL - MUSE: 168 MS
QRS DURATION - MUSE: 92 MS
QT - MUSE: 392 MS
QTC - MUSE: 425 MS
R AXIS - MUSE: 1 DEGREES
SYSTOLIC BLOOD PRESSURE - MUSE: NORMAL MMHG
T AXIS - MUSE: 11 DEGREES
VENTRICULAR RATE- MUSE: 71 BPM

## 2022-02-08 PROCEDURE — 71046 X-RAY EXAM CHEST 2 VIEWS: CPT

## 2022-02-08 NOTE — DISCHARGE INSTRUCTIONS
You've been referred to post covid recovery clinic. They will contact you for appointment  Motrin and tylenol for pain as needed  Follow up with your doctor

## 2022-02-08 NOTE — ED PROVIDER NOTES
History   Chief Complaint:  Chest Pain       HPI   Sherif Moraes is a 51 year old male who presents with chest pain. The patient reports that he has had intermittent chest pain since he was diagnosed with COVID pneumonia 2 months ago. He states that the chest pain became more intense today and radiated across his chest. He was still able to work out today and says the pain is not positional or worsened by exertion. He denies fever, cough, shortness of breath, or leg swelling. He denies any recent injuries. The patient is not vaccinated against COVID and received monoclonal antibody infusion after having COVID.    Review of Systems   Constitutional: Negative for fever.   Respiratory: Negative for cough and shortness of breath.    Cardiovascular: Positive for chest pain. Negative for leg swelling.   All other systems reviewed and are negative.    Allergies:  The patient has no known allergies.     Medications:  Flexeril  Valium  Vistaril  Robaxin  Albuterol     Past Medical History:     Epididymitis  Scarlet fever  Sebaceous cyst    Social History:  Patient presents alone    Physical Exam     Patient Vitals for the past 24 hrs:   BP Temp Pulse Resp SpO2 Weight   02/07/22 2149 (!) 141/96 98.6  F (37  C) 82 16 98 % 83.4 kg (183 lb 13.8 oz)       Physical Exam  Constitutional:       Appearance: He is well-developed.   HENT:      Right Ear: External ear normal.      Left Ear: External ear normal.      Mouth/Throat:      Mouth: Mucous membranes are moist.      Pharynx: Oropharynx is clear. No oropharyngeal exudate or posterior oropharyngeal erythema.   Eyes:      General: No scleral icterus.     Extraocular Movements: Extraocular movements intact.      Conjunctiva/sclera: Conjunctivae normal.      Pupils: Pupils are equal, round, and reactive to light.   Cardiovascular:      Rate and Rhythm: Normal rate and regular rhythm.      Heart sounds: Normal heart sounds. No murmur heard.  No friction rub. No gallop.     Pulmonary:      Effort: Pulmonary effort is normal. No respiratory distress.      Breath sounds: Normal breath sounds. No wheezing or rales.   Abdominal:      General: Bowel sounds are normal. There is no distension.      Palpations: Abdomen is soft. There is no mass.      Tenderness: There is no abdominal tenderness.   Musculoskeletal:         General: Normal range of motion.   Skin:     General: Skin is warm and dry.      Capillary Refill: Capillary refill takes less than 2 seconds.      Findings: No rash.   Neurological:      Mental Status: He is alert.       Emergency Department Course   ECG  ECG obtained at 2156, ECG read at 2157  Normal sinus rhythm  Normal ECG.  Rate 71 bpm. CT interval 168 ms. QRS duration 92 ms. QT/QTc 392/425 ms. P-R-T axes 44 1 11.     Imaging:  XR Chest 2 Views   Final Result   IMPRESSION: Heart size is normal. Lungs are clear bilaterally. Left midlung calcified granuloma redemonstrated. No acute infiltrates or effusions. Mediastinum and bony structures are unremarkable.        Report per radiology    Laboratory:  Labs Ordered and Resulted from Time of ED Arrival to Time of ED Departure   COMPREHENSIVE METABOLIC PANEL - Abnormal       Result Value    Sodium 140      Potassium 3.3 (*)     Chloride 108      Carbon Dioxide (CO2) 27      Anion Gap 5      Urea Nitrogen 19      Creatinine 1.06      Calcium 8.8      Glucose 103 (*)     Alkaline Phosphatase 50      AST 18      ALT 23      Protein Total 7.1      Albumin 3.6      Bilirubin Total 0.3      GFR Estimate 86     CBC WITH PLATELETS AND DIFFERENTIAL - Abnormal    WBC Count 6.6      RBC Count 3.91 (*)     Hemoglobin 12.4 (*)     Hematocrit 37.6 (*)     MCV 96      MCH 31.7      MCHC 33.0      RDW 13.0      Platelet Count 264      % Neutrophils 63      % Lymphocytes 25      % Monocytes 9      % Eosinophils 2      % Basophils 1      % Immature Granulocytes 0      NRBCs per 100 WBC 0      Absolute Neutrophils 4.1      Absolute Lymphocytes  1.6      Absolute Monocytes 0.6      Absolute Eosinophils 0.1      Absolute Basophils 0.1      Absolute Immature Granulocytes 0.0      Absolute NRBCs 0.0     D DIMER QUANTITATIVE - Normal    D-Dimer Quantitative <0.27     TROPONIN I - Normal    Troponin I High Sensitivity 40          Emergency Department Course:     Reviewed:  I reviewed nursing notes, vitals, past medical history and Care Everywhere    Assessments:  2203 I obtained history and examined the patient as noted above.   0030 I rechecked the patient and explained findings.     Disposition:  The patient was discharged to home.     Impression & Plan       Medical Decision Making:  Patient presents today for evaluation of chest pain.  Patient did recover from Covid at the end of December.  He has been having intermittent chest pain since then.  He notes the chest pain is nonexertional and seem to improve with exertion.  Evaluation here did not reveal any acute causes of his chest pain.  D-dimer is negative and chest x-ray is negative.  I made a referral for him to go to the post Covid recovery clinic given that he is having long-term symptoms  He is to follow-up with his regular physician in the meantime.  He can use Tylenol and ibuprofen as needed for pain.  Return precautions provided.  Patient is reassured.    Diagnosis:    ICD-10-CM    1. Atypical chest pain  R07.89 Adult Post Covid Clinic Referral         Scribe Disclosure:  I, Cristina Piper, am serving as a scribe at 10:12 PM on 2/7/2022 to document services personally performed by Yessy Mckeon MD based on my observations and the provider's statements to me.          Yessy Mckeon MD  02/08/22 0038

## 2022-02-08 NOTE — ED TRIAGE NOTES
Pt states he has had on/off chest pain for 2 months since he had covid.  No shortness of breath is able to workout.  nothing make the pain better or worse

## 2022-02-12 ENCOUNTER — HEALTH MAINTENANCE LETTER (OUTPATIENT)
Age: 52
End: 2022-02-12

## 2022-02-14 SDOH — SOCIAL STABILITY: SOCIAL NETWORK: PROMIS ABILITY TO PARTICIPATE IN SOCIAL ROLES & ACTIVITIES T-SCORE: 64.1

## 2022-02-14 SDOH — SOCIAL STABILITY: SOCIAL NETWORK: I HAVE TROUBLE DOING ALL OF THE FAMILY ACTIVITIES THAT I WANT TO DO: NEVER

## 2022-02-14 SDOH — SOCIAL STABILITY: SOCIAL NETWORK: I HAVE TROUBLE DOING ALL OF MY USUAL WORK (INCLUDE WORK AT HOME): NEVER

## 2022-02-14 SDOH — SOCIAL STABILITY: SOCIAL NETWORK

## 2022-02-14 SDOH — SOCIAL STABILITY: SOCIAL NETWORK: I HAVE TROUBLE DOING ALL OF THE ACTIVITIES WITH FRIENDS THAT I WANT TO DO: NEVER

## 2022-02-14 SDOH — SOCIAL STABILITY: SOCIAL NETWORK: I HAVE TROUBLE DOING ALL OF MY REGULAR LEISURE ACTIVITIES WITH OTHERS: NEVER

## 2022-02-14 ASSESSMENT — ANXIETY QUESTIONNAIRES
3. WORRYING TOO MUCH ABOUT DIFFERENT THINGS: NOT AT ALL
2. NOT BEING ABLE TO STOP OR CONTROL WORRYING: NOT AT ALL
2. NOT BEING ABLE TO STOP OR CONTROL WORRYING: NOT AT ALL
GAD7 TOTAL SCORE: 0
7. FEELING AFRAID AS IF SOMETHING AWFUL MIGHT HAPPEN: NOT AT ALL
1. FEELING NERVOUS, ANXIOUS, OR ON EDGE: NOT AT ALL
4. TROUBLE RELAXING: NOT AT ALL
6. BECOMING EASILY ANNOYED OR IRRITABLE: NOT AT ALL
4. TROUBLE RELAXING: NOT AT ALL
GAD7 TOTAL SCORE: 0
3. WORRYING TOO MUCH ABOUT DIFFERENT THINGS: NOT AT ALL
GAD7 TOTAL SCORE: 0
7. FEELING AFRAID AS IF SOMETHING AWFUL MIGHT HAPPEN: NOT AT ALL
7. FEELING AFRAID AS IF SOMETHING AWFUL MIGHT HAPPEN: NOT AT ALL
1. FEELING NERVOUS, ANXIOUS, OR ON EDGE: NOT AT ALL
5. BEING SO RESTLESS THAT IT IS HARD TO SIT STILL: NOT AT ALL
5. BEING SO RESTLESS THAT IT IS HARD TO SIT STILL: NOT AT ALL
6. BECOMING EASILY ANNOYED OR IRRITABLE: NOT AT ALL

## 2022-02-14 ASSESSMENT — ENCOUNTER SYMPTOMS
LIGHT-HEADEDNESS: 0
LEG PAIN: 0
SLEEP DISTURBANCES DUE TO BREATHING: 0
ORTHOPNEA: 0
PALPITATIONS: 0
EXERCISE INTOLERANCE: 0
HYPOTENSION: 0
SYNCOPE: 0
HYPERTENSION: 0

## 2022-02-14 NOTE — PROGRESS NOTES
Sherif is a 51 year old who is being evaluated via a billable video visit.      How would you like to obtain your AVS? MyChart  If the video visit is dropped, the invitation should be resent by: Send to e-mail at: [email or web address]  Will anyone else be joining your video visit? No     Video-Visit Details    Type of service:  Video Visit    Video Start Time: 11:30 am    Video End Time:12:06 pm    Originating Location (pt. Location): Home    Distant Location (provider location):  [facility] [place]     Platform used for Video Visit: Beaming     This 51 year old  male presents to the [facility] Rehabilitation Medicine Post-COVID clinic as a new consult to evaluate continuing symptoms after COVID infection initially diagnosed 12/3/21 with low grade fever, aches and shortness of breath.  Sherif Moraes presented to ED on 12/13/21 complaining of shortness of breath, chest pain, fever and generalized aches and pains. Treatment was monoclonal antibodies as an outpatient.    Continuing symptoms include chest pain and anxiety.  The chest pain is not brought on by any specific activity.  He is not sure if it is the anxiety that causes it as he fears getting COVID again.  He has had anxiety issues about 10 years ago requiring treatment.  He had similar chest pain then.  The chest pain is described as squeezing and pressure.  EKG has been negative.  There is no pain down his arms, into jaw. No sweats.  It is not associated with exertion and working out helps.  The patient has not been vaccinated against COVID.  His family has a history of blood clots and he was concerned about the association with the vaccines.  He is working as a  full time from home.  He is comfortable with this.  He is back to regular exercise and running.  He does not feel limited.    History of COVID-19 infection: 12/3/21  Date of first symptoms:  13/3/21  Diagnosis: PCR  Hospitalization: No  Treatment: Ambulatory: Antibodies- Yes, monoclonal  Current Symptoms:  chest pain and anxiety  Goals of Care: Decrease anxiety and address chest pain    Current concerns: No  [id number]}  PHQ Assesment Total Score(s) 2/14/2022   PHQ-2 Score 0   Some recent data might be hidden     HILDA-7 Results 2/14/2022   HILDA 7 TOTAL SCORE 0 (minimal anxiety)   Some recent data might be hidden     PTSD Screen Score 2/14/2022   Have you ever experienced this kind of event? Yes   PTSD Screen (Score of 3 or more suggests positive screen) 0   Some recent data might be hidden     PROMIS-29 2/14/2022   PROMIS Physical Function T-Score 56.9 (within normal limits)   PROMIS Anxiety T-Score 40.3 (within normal limits)   PROMIS Depression T-Score 41 (within normal limits)   PROMIS Fatigue T-Score 33.7 (within normal limits)   PROMIS Sleep Disturbance T-Score 67.5 (moderate)   PROMIS Ability to Participate in Social Roles & Activities T-Score 64.1 (within normal limits)   PROMIS Pain Interference T-Score 41.6 (within normal limits)   PROMIS Pain Intensity 3     No flowsheet data found.    No past medical history on file.    No past surgical history on file.    No family history on file.    Social History     Tobacco Use     Smoking status: Never Smoker     Smokeless tobacco: Never Used   Substance Use Topics     Alcohol use: No     Drug use: No         Current Outpatient Medications:      Cyclobenzaprine HCl (FLEXERIL PO), Take 10 mg by mouth., Disp: , Rfl:      diazepam (VALIUM) 5 MG tablet, Take 1 tablet (5 mg) by mouth every 12 hours as needed for anxiety, Disp: 3 tablet, Rfl: 0     HydrOXYzine Pamoate (VISTARIL PO), Take 25 mg by mouth 2 times daily as needed , Disp: , Rfl:      ibuprofen (ADVIL,MOTRIN) 200 MG tablet, Take 3 tablets (600 mg) by mouth every 8 hours as needed for pain, Disp: 30 tablet, Rfl: 0     methocarbamol (ROBAXIN) 500 MG tablet, Take 1 tablet (500 mg) by mouth 4 times daily as  needed, Disp: 15 tablet, Rfl: 0    Review of Systems   Cardiovascular: Positive for chest pain. Negative for palpitations.   Neurological: Negative for light-headedness.         Objective         Labs:      I personally reviewed the following lab results today and those on care everywhere, if indicated     No results found for: CRP   No results found for: SED   Last Renal Panel:  Sodium   Date Value Ref Range Status   02/07/2022 140 133 - 144 mmol/L Final   01/07/2019 143 133 - 144 mmol/L Final     Potassium   Date Value Ref Range Status   02/07/2022 3.3 (L) 3.4 - 5.3 mmol/L Final   01/07/2019 4.1 3.4 - 5.3 mmol/L Final     Chloride   Date Value Ref Range Status   02/07/2022 108 94 - 109 mmol/L Final   01/07/2019 108 94 - 109 mmol/L Final     Carbon Dioxide   Date Value Ref Range Status   01/07/2019 27 20 - 32 mmol/L Final     Carbon Dioxide (CO2)   Date Value Ref Range Status   02/07/2022 27 20 - 32 mmol/L Final     Anion Gap   Date Value Ref Range Status   02/07/2022 5 3 - 14 mmol/L Final   01/07/2019 8 3 - 14 mmol/L Final     Glucose   Date Value Ref Range Status   02/07/2022 103 (H) 70 - 99 mg/dL Final   01/07/2019 105 (H) 70 - 99 mg/dL Final     Urea Nitrogen   Date Value Ref Range Status   02/07/2022 19 7 - 30 mg/dL Final   01/07/2019 20 7 - 30 mg/dL Final     Creatinine   Date Value Ref Range Status   02/07/2022 1.06 0.66 - 1.25 mg/dL Final   01/07/2019 0.91 0.66 - 1.25 mg/dL Final     GFR Estimate   Date Value Ref Range Status   02/07/2022 86 >60 mL/min/1.73m2 Final     Comment:     Effective December 21, 2021 eGFRcr in adults is calculated using the 2021 CKD-EPI creatinine equation which includes age and gender (Britney et al., NEJM, DOI: 10.1056/AKBUcf0193478)   01/07/2019 >90 >60 mL/min/[1.73_m2] Final     Comment:     Non  GFR Calc  Starting 12/18/2018, serum creatinine based estimated GFR (eGFR) will be   calculated using the Chronic Kidney Disease Epidemiology Collaboration   (CKD-EPI)  equation.       Calcium   Date Value Ref Range Status   02/07/2022 8.8 8.5 - 10.1 mg/dL Final   01/07/2019 8.6 8.5 - 10.1 mg/dL Final     Albumin   Date Value Ref Range Status   02/07/2022 3.6 3.4 - 5.0 g/dL Final   03/03/2014 4.8 3.9 - 5.1 g/dL Final      Lab Results   Component Value Date    WBC 6.6 02/07/2022    WBC 5.8 01/07/2019     Lab Results   Component Value Date    RBC 3.91 02/07/2022    RBC 3.53 01/07/2019     Lab Results   Component Value Date    HGB 12.4 02/07/2022    HGB 11.0 01/07/2019     Lab Results   Component Value Date    HCT 37.6 02/07/2022    HCT 33.6 01/07/2019     No components found for: MCT  Lab Results   Component Value Date    MCV 96 02/07/2022    MCV 95 01/07/2019     Lab Results   Component Value Date    MCH 31.7 02/07/2022    MCH 31.2 01/07/2019     Lab Results   Component Value Date    MCHC 33.0 02/07/2022    MCHC 32.7 01/07/2019     Lab Results   Component Value Date    RDW 13.0 02/07/2022    RDW 12.3 01/07/2019     Lab Results   Component Value Date     02/07/2022     01/07/2019      No results found for: A1C   No results found for: TSH   No results found for: VITDT     Imaging:    I personally reviewed the following imaging results today and those on care everywhere, if indicated     EXAM: XR CHEST 2 VW  LOCATION: [facility]  DATE/TIME: 2/8/2022 12:09 AM     INDICATION: Chest pain  COMPARISON: 12/13/2021                                                                      IMPRESSION: Heart size is normal. Lungs are clear bilaterally. Left midlung calcified granuloma redemonstrated. No acute infiltrates or effusions. Mediastinum and bony structures are unremarkable.    Note:Granuloma unchanged from 2014.    Physical Exam:    OBJECTIVE:     Vitals:  No vitals were obtained today due to virtual visit.  Reports he has not had a fever.    Physical Exam   GENERAL: Healthy, alert and no distress  EYES: Eyes grossly normal to inspection.  No discharge  or erythema, or obvious scleral/conjunctival abnormalities.  RESP: No audible wheeze, cough, or visible cyanosis.  No visible retractions or increased work of breathing.    SKIN: Visible skin clear. No significant rash, abnormal pigmentation or lesions.  NEURO: Cranial nerves grossly intact.  Mentation and speech appropriate for age.  PSYCH: Mentation appears normal, affect normal/bright, judgement and insight intact, normal speech and appearance well-groomed.  MUSCULOSKELETAL: Visible strength appears normal. There is no pain to palpation of the costochondral junctions lateral to the sternum.  He demonstrates this on video with instruction.      Impression:  1. Post acute sequelae of COVID (Long COVID syndrome)  2. Chest pain  3. Anxiety  4. Vit D deficiency ?      Plan:  1. Discussed post -COVID.  Questions answered and education provided.  2. I suspect his chest pain is caused by post-COVID anxiety, however, to help decrease this and for completion I will refer him to cardiology.  He is in agreement.  3. To address the anxiety symptoms Behavioral Health Referral has been written.  4. I will ask for Vit D level to be checked.  I reviewed his other labs.  5. Reviewed data on vaccinations.  Questions answered.  He will be 90 days post COVID in mid-March and will plan to start the Pfizer or Moderna series then.  6. Follow up with me in 3 months.  7. If any further concerns and/or questions he will contact the clinic.        Akua Mendez MD, FAAPMR   Department Rehabilitation Medicine-Wound Fellowship Director  Adjunct  Department Family Medicine and Community Health  [facility]

## 2022-02-15 ENCOUNTER — VIRTUAL VISIT (OUTPATIENT)
Dept: PHYSICAL MEDICINE AND REHAB | Facility: CLINIC | Age: 52
End: 2022-02-15
Attending: EMERGENCY MEDICINE
Payer: COMMERCIAL

## 2022-02-15 DIAGNOSIS — R07.89 ATYPICAL CHEST PAIN: ICD-10-CM

## 2022-02-15 DIAGNOSIS — U09.9 POST-COVID SYNDROME: Primary | ICD-10-CM

## 2022-02-15 DIAGNOSIS — E55.9 VITAMIN D DEFICIENCY: ICD-10-CM

## 2022-02-15 DIAGNOSIS — F41.9 ANXIETY: ICD-10-CM

## 2022-02-15 PROCEDURE — 99204 OFFICE O/P NEW MOD 45 MIN: CPT | Mod: 95 | Performed by: PHYSICAL MEDICINE & REHABILITATION

## 2022-02-15 ASSESSMENT — ENCOUNTER SYMPTOMS
PALPITATIONS: 0
LIGHT-HEADEDNESS: 0

## 2022-02-15 ASSESSMENT — ANXIETY QUESTIONNAIRES: GAD7 TOTAL SCORE: 0

## 2022-02-15 NOTE — LETTER
2/15/2022       RE: Sherif Moraes  [id number] [facility] [address]     Dear Colleague,    Thank you for referring your patient, Sherif Moraes, to the [facility] [place] [facility]. Please see a copy of my visit note below.    Sherif is a 51 year old who is being evaluated via a billable video visit.      How would you like to obtain your AVS? MyChart  If the video visit is dropped, the invitation should be resent by: Send to e-mail at: [email or web address]  Will anyone else be joining your video visit? No     Video-Visit Details    Type of service:  Video Visit    Video Start Time: 11:30 am    Video End Time:12:06 pm    Originating Location (pt. Location): Home    Distant Location (provider location):  [facility]     Platform used for Video Visit: [email or web address]     This 51 year old  male presents to the [facility] Rehabilitation Medicine Post-COVID clinic as a new consult to evaluate continuing symptoms after COVID infection initially diagnosed 12/3/21 with low grade fever, aches and shortness of breath.  Sherif Moraes presented to ED on 12/13/21 complaining of shortness of breath, chest pain, fever and generalized aches and pains. Treatment was monoclonal antibodies as an outpatient.    Continuing symptoms include chest pain and anxiety.  The chest pain is not brought on by any specific activity.  He is not sure if it is the anxiety that causes it as he fears getting COVID again.  He has had anxiety issues about 10 years ago requiring treatment.  He had similar chest pain then.  The chest pain is described as squeezing and pressure.  EKG has been negative.  There is no pain down his arms, into jaw. No sweats.  It is not associated with exertion and working out helps.  The patient has not been vaccinated against COVID.   His family has a history of blood clots and he was concerned about the association with the vaccines.  He is working as a  full time from home.  He is comfortable with this.  He is back to regular exercise and running.  He does not feel limited.    History of COVID-19 infection: 12/3/21  Date of first symptoms: 13/3/21  Diagnosis: PCR  Hospitalization: No  Treatment: Ambulatory: Antibodies- Yes, monoclonal  Current Symptoms:  chest pain and anxiety  Goals of Care: Decrease anxiety and address chest pain    Current concerns: No  [id number]}  PHQ Assesment Total Score(s) 2/14/2022   PHQ-2 Score 0   Some recent data might be hidden     HILDA-7 Results 2/14/2022   HILDA 7 TOTAL SCORE 0 (minimal anxiety)   Some recent data might be hidden     PTSD Screen Score 2/14/2022   Have you ever experienced this kind of event? Yes   PTSD Screen (Score of 3 or more suggests positive screen) 0   Some recent data might be hidden     PROMIS-29 2/14/2022   PROMIS Physical Function T-Score 56.9 (within normal limits)   PROMIS Anxiety T-Score 40.3 (within normal limits)   PROMIS Depression T-Score 41 (within normal limits)   PROMIS Fatigue T-Score 33.7 (within normal limits)   PROMIS Sleep Disturbance T-Score 67.5 (moderate)   PROMIS Ability to Participate in Social Roles & Activities T-Score 64.1 (within normal limits)   PROMIS Pain Interference T-Score 41.6 (within normal limits)   PROMIS Pain Intensity 3     No flowsheet data found.    No past medical history on file.    No past surgical history on file.    No family history on file.    Social History     Tobacco Use     Smoking status: Never Smoker     Smokeless tobacco: Never Used   Substance Use Topics     Alcohol use: No     Drug use: No         Current Outpatient Medications:      Cyclobenzaprine HCl (FLEXERIL PO), Take 10 mg by mouth., Disp: , Rfl:      diazepam (VALIUM) 5 MG tablet, Take 1 tablet (5 mg) by mouth every 12 hours as needed for anxiety, Disp: 3 tablet,  Rfl: 0     HydrOXYzine Pamoate (VISTARIL PO), Take 25 mg by mouth 2 times daily as needed , Disp: , Rfl:      ibuprofen (ADVIL,MOTRIN) 200 MG tablet, Take 3 tablets (600 mg) by mouth every 8 hours as needed for pain, Disp: 30 tablet, Rfl: 0     methocarbamol (ROBAXIN) 500 MG tablet, Take 1 tablet (500 mg) by mouth 4 times daily as needed, Disp: 15 tablet, Rfl: 0    Review of Systems   Cardiovascular: Positive for chest pain. Negative for palpitations.   Neurological: Negative for light-headedness.         Objective         Labs:      I personally reviewed the following lab results today and those on care everywhere, if indicated     No results found for: CRP   No results found for: SED   Last Renal Panel:  Sodium   Date Value Ref Range Status   02/07/2022 140 133 - 144 mmol/L Final   01/07/2019 143 133 - 144 mmol/L Final     Potassium   Date Value Ref Range Status   02/07/2022 3.3 (L) 3.4 - 5.3 mmol/L Final   01/07/2019 4.1 3.4 - 5.3 mmol/L Final     Chloride   Date Value Ref Range Status   02/07/2022 108 94 - 109 mmol/L Final   01/07/2019 108 94 - 109 mmol/L Final     Carbon Dioxide   Date Value Ref Range Status   01/07/2019 27 20 - 32 mmol/L Final     Carbon Dioxide (CO2)   Date Value Ref Range Status   02/07/2022 27 20 - 32 mmol/L Final     Anion Gap   Date Value Ref Range Status   02/07/2022 5 3 - 14 mmol/L Final   01/07/2019 8 3 - 14 mmol/L Final     Glucose   Date Value Ref Range Status   02/07/2022 103 (H) 70 - 99 mg/dL Final   01/07/2019 105 (H) 70 - 99 mg/dL Final     Urea Nitrogen   Date Value Ref Range Status   02/07/2022 19 7 - 30 mg/dL Final   01/07/2019 20 7 - 30 mg/dL Final     Creatinine   Date Value Ref Range Status   02/07/2022 1.06 0.66 - 1.25 mg/dL Final   01/07/2019 0.91 0.66 - 1.25 mg/dL Final     GFR Estimate   Date Value Ref Range Status   02/07/2022 86 >60 mL/min/1.73m2 Final     Comment:     Effective December 21, 2021 eGFRcr in adults is calculated using the 2021 CKD-EPI creatinine  equation which includes age and gender (Britney baptiste al., NEJ, DOI: 10.1056/RAQJyi3087072)   01/07/2019 >90 >60 mL/min/[1.73_m2] Final     Comment:     Non  GFR Calc  Starting 12/18/2018, serum creatinine based estimated GFR (eGFR) will be   calculated using the Chronic Kidney Disease Epidemiology Collaboration   (CKD-EPI) equation.       Calcium   Date Value Ref Range Status   02/07/2022 8.8 8.5 - 10.1 mg/dL Final   01/07/2019 8.6 8.5 - 10.1 mg/dL Final     Albumin   Date Value Ref Range Status   02/07/2022 3.6 3.4 - 5.0 g/dL Final   03/03/2014 4.8 3.9 - 5.1 g/dL Final      Lab Results   Component Value Date    WBC 6.6 02/07/2022    WBC 5.8 01/07/2019     Lab Results   Component Value Date    RBC 3.91 02/07/2022    RBC 3.53 01/07/2019     Lab Results   Component Value Date    HGB 12.4 02/07/2022    HGB 11.0 01/07/2019     Lab Results   Component Value Date    HCT 37.6 02/07/2022    HCT 33.6 01/07/2019     No components found for: MCT  Lab Results   Component Value Date    MCV 96 02/07/2022    MCV 95 01/07/2019     Lab Results   Component Value Date    MCH 31.7 02/07/2022    MCH 31.2 01/07/2019     Lab Results   Component Value Date    MCHC 33.0 02/07/2022    MCHC 32.7 01/07/2019     Lab Results   Component Value Date    RDW 13.0 02/07/2022    RDW 12.3 01/07/2019     Lab Results   Component Value Date     02/07/2022     01/07/2019      No results found for: A1C   No results found for: TSH   No results found for: VITDT     Imaging:    I personally reviewed the following imaging results today and those on care everywhere, if indicated     EXAM: XR CHEST 2 VW  LOCATION: [facility]  DATE/TIME: 2/8/2022 12:09 AM     INDICATION: Chest pain  COMPARISON: 12/13/2021                                                                      IMPRESSION: Heart size is normal. Lungs are clear bilaterally. Left midlung calcified granuloma redemonstrated. No acute infiltrates or  effusions. Mediastinum and bony structures are unremarkable.    Note:Granuloma unchanged from 2014.    Physical Exam:    OBJECTIVE:     Vitals:  No vitals were obtained today due to virtual visit.  Reports he has not had a fever.    Physical Exam   GENERAL: Healthy, alert and no distress  EYES: Eyes grossly normal to inspection.  No discharge or erythema, or obvious scleral/conjunctival abnormalities.  RESP: No audible wheeze, cough, or visible cyanosis.  No visible retractions or increased work of breathing.    SKIN: Visible skin clear. No significant rash, abnormal pigmentation or lesions.  NEURO: Cranial nerves grossly intact.  Mentation and speech appropriate for age.  PSYCH: Mentation appears normal, affect normal/bright, judgement and insight intact, normal speech and appearance well-groomed.  MUSCULOSKELETAL: Visible strength appears normal. There is no pain to palpation of the costochondral junctions lateral to the sternum.  He demonstrates this on video with instruction.      Impression:  1. Post acute sequelae of COVID (Long COVID syndrome)  2. Chest pain  3. Anxiety  4. Vit D deficiency ?      Plan:  1. Discussed post -COVID.  Questions answered and education provided.  2. I suspect his chest pain is caused by post-COVID anxiety, however, to help decrease this and for completion I will refer him to cardiology.  He is in agreement.  3. To address the anxiety symptoms Behavioral Health Referral has been written.  4. I will ask for Vit D level to be checked.  I reviewed his other labs.  5. Reviewed data on vaccinations.  Questions answered.  He will be 90 days post COVID in mid-March and will plan to start the Pfizer or Moderna series then.  6. Follow up with me in 3 months.  7. If any further concerns and/or questions he will contact the clinic.        Akua Mendez MD, FAAPMR   Department Rehabilitation Medicine-Wound Fellowship Director  Adjunct  Department  Family Medicine and Community Health  [facility]

## 2022-05-10 ENCOUNTER — HOSPITAL ENCOUNTER (EMERGENCY)
Facility: CLINIC | Age: 52
Discharge: HOME | End: 2022-05-10
Attending: EMERGENCY MEDICINE | Admitting: EMERGENCY MEDICINE
Payer: COMMERCIAL

## 2022-05-10 ENCOUNTER — APPOINTMENT (OUTPATIENT)
Dept: CT IMAGING | Facility: CLINIC | Age: 52
End: 2022-05-10
Attending: EMERGENCY MEDICINE
Payer: COMMERCIAL

## 2022-05-10 VITALS
DIASTOLIC BLOOD PRESSURE: 78 MMHG | SYSTOLIC BLOOD PRESSURE: 115 MMHG | RESPIRATION RATE: 18 BRPM | OXYGEN SATURATION: 100 % | HEART RATE: 49 BPM | TEMPERATURE: 97.4 F

## 2022-05-10 DIAGNOSIS — R51.9 NONINTRACTABLE HEADACHE, UNSPECIFIED CHRONICITY PATTERN, UNSPECIFIED HEADACHE TYPE: ICD-10-CM

## 2022-05-10 LAB
ANION GAP SERPL CALCULATED.3IONS-SCNC: 4 MMOL/L (ref 3–14)
BASOPHILS # BLD AUTO: 0 10E3/UL (ref 0–0.2)
BASOPHILS NFR BLD AUTO: 1 %
BUN SERPL-MCNC: 15 MG/DL (ref 7–30)
CALCIUM SERPL-MCNC: 9.1 MG/DL (ref 8.5–10.1)
CHLORIDE BLD-SCNC: 105 MMOL/L (ref 94–109)
CO2 SERPL-SCNC: 30 MMOL/L (ref 20–32)
CREAT SERPL-MCNC: 0.8 MG/DL (ref 0.66–1.25)
EOSINOPHIL # BLD AUTO: 0 10E3/UL (ref 0–0.7)
EOSINOPHIL NFR BLD AUTO: 1 %
ERYTHROCYTE [DISTWIDTH] IN BLOOD BY AUTOMATED COUNT: 12.8 % (ref 10–15)
FLUAV RNA SPEC QL NAA+PROBE: NEGATIVE
FLUBV RNA RESP QL NAA+PROBE: NEGATIVE
GFR SERPL CREATININE-BSD FRML MDRD: >90 ML/MIN/1.73M2
GLUCOSE BLD-MCNC: 91 MG/DL (ref 70–99)
HCT VFR BLD AUTO: 43.1 % (ref 40–53)
HGB BLD-MCNC: 13.9 G/DL (ref 13.3–17.7)
HOLD SPECIMEN: NORMAL
HOLD SPECIMEN: NORMAL
IMM GRANULOCYTES # BLD: 0 10E3/UL
IMM GRANULOCYTES NFR BLD: 0 %
LYMPHOCYTES # BLD AUTO: 0.9 10E3/UL (ref 0.8–5.3)
LYMPHOCYTES NFR BLD AUTO: 27 %
MCH RBC QN AUTO: 31.1 PG (ref 26.5–33)
MCHC RBC AUTO-ENTMCNC: 32.3 G/DL (ref 31.5–36.5)
MCV RBC AUTO: 96 FL (ref 78–100)
MONOCYTES # BLD AUTO: 0.4 10E3/UL (ref 0–1.3)
MONOCYTES NFR BLD AUTO: 11 %
NEUTROPHILS # BLD AUTO: 2.1 10E3/UL (ref 1.6–8.3)
NEUTROPHILS NFR BLD AUTO: 60 %
NRBC # BLD AUTO: 0 10E3/UL
NRBC BLD AUTO-RTO: 0 /100
PLATELET # BLD AUTO: 260 10E3/UL (ref 150–450)
POTASSIUM BLD-SCNC: 3.8 MMOL/L (ref 3.4–5.3)
RBC # BLD AUTO: 4.47 10E6/UL (ref 4.4–5.9)
RSV RNA SPEC NAA+PROBE: NEGATIVE
SARS-COV-2 RNA RESP QL NAA+PROBE: NEGATIVE
SODIUM SERPL-SCNC: 139 MMOL/L (ref 133–144)
WBC # BLD AUTO: 3.5 10E3/UL (ref 4–11)

## 2022-05-10 PROCEDURE — 99285 EMERGENCY DEPT VISIT HI MDM: CPT | Mod: 25,CS

## 2022-05-10 PROCEDURE — 258N000003 HC RX IP 258 OP 636: Performed by: EMERGENCY MEDICINE

## 2022-05-10 PROCEDURE — 250N000011 HC RX IP 250 OP 636: Performed by: EMERGENCY MEDICINE

## 2022-05-10 PROCEDURE — 70450 CT HEAD/BRAIN W/O DYE: CPT

## 2022-05-10 PROCEDURE — 80048 BASIC METABOLIC PNL TOTAL CA: CPT | Performed by: EMERGENCY MEDICINE

## 2022-05-10 PROCEDURE — 36415 COLL VENOUS BLD VENIPUNCTURE: CPT | Performed by: EMERGENCY MEDICINE

## 2022-05-10 PROCEDURE — 87637 SARSCOV2&INF A&B&RSV AMP PRB: CPT | Performed by: EMERGENCY MEDICINE

## 2022-05-10 PROCEDURE — C9803 HOPD COVID-19 SPEC COLLECT: HCPCS

## 2022-05-10 PROCEDURE — 85025 COMPLETE CBC W/AUTO DIFF WBC: CPT | Performed by: EMERGENCY MEDICINE

## 2022-05-10 RX ORDER — SODIUM CHLORIDE 9 MG/ML
INJECTION, SOLUTION INTRAVENOUS CONTINUOUS
Status: DISCONTINUED | OUTPATIENT
Start: 2022-05-10 | End: 2022-05-10 | Stop reason: HOSPADM

## 2022-05-10 RX ORDER — DIPHENHYDRAMINE HYDROCHLORIDE 50 MG/ML
12.5 INJECTION INTRAMUSCULAR; INTRAVENOUS ONCE
Status: COMPLETED | OUTPATIENT
Start: 2022-05-10 | End: 2022-05-10

## 2022-05-10 RX ADMIN — SODIUM CHLORIDE 1000 ML: 9 INJECTION, SOLUTION INTRAVENOUS at 14:21

## 2022-05-10 RX ADMIN — DIPHENHYDRAMINE HYDROCHLORIDE 12.5 MG: 50 INJECTION INTRAMUSCULAR; INTRAVENOUS at 14:21

## 2022-05-10 RX ADMIN — PROCHLORPERAZINE EDISYLATE 5 MG: 5 INJECTION INTRAMUSCULAR; INTRAVENOUS at 14:21

## 2022-05-10 ASSESSMENT — ENCOUNTER SYMPTOMS
LIGHT-HEADEDNESS: 0
DIZZINESS: 0
NECK PAIN: 0
NAUSEA: 0
VOMITING: 0
NECK STIFFNESS: 0
HEADACHES: 1

## 2022-05-10 NOTE — ED PROVIDER NOTES
History   Chief Complaint:  Headache       HPI   Sherif Moraes is a 52 year old male who presents with headache. The patient reports that he has been having intermittent headache the past couple weeks. He states the headache is localized to the top of head. The patient reports he went to the chiropractor this past week and they told him he has muscle tension in neck and back. He states that he thinks he has been having increased anxiety and which has been increasing tension stress. The patient denies any visual disturbance, lightheadedness, nausea and vomiting. He denies any neck pain or stiffness as well. The patient denies history of migraine.     Review of Systems   Eyes: Negative for visual disturbance.   Gastrointestinal: Negative for nausea and vomiting.   Musculoskeletal: Negative for neck pain and neck stiffness.   Neurological: Positive for headaches. Negative for dizziness and light-headedness.   All other systems reviewed and are negative.      Allergies:  The patient has no known allergies.     Medications:  Flexeril   Valium   Vistaril   Robaxin     Past Medical History:     The patient denies any significant past medical history.     Social History:  The patient presents alone.     Physical Exam     Patient Vitals for the past 24 hrs:   BP Temp Pulse Resp SpO2   05/10/22 1500 115/78 -- (!) 49 -- 100 %   05/10/22 1445 118/82 -- 56 -- 100 %   05/10/22 1430 (!) 167/38 -- -- -- 99 %   05/10/22 1400 137/89 -- 66 -- 100 %   05/10/22 1345 133/80 -- 64 -- 100 %   05/10/22 1330 135/83 -- 60 -- 100 %   05/10/22 1315 (!) 137/91 -- 68 -- 100 %   05/10/22 1136 (!) 140/86 97.4  F (36.3  C) 76 18 98 %       Physical Exam  Constitutional: Patient is well appearing. No distress.  Head: Atraumatic.  Eyes: Conjunctivae and EOM are normal. No scleral icterus.  Neck: Normal range of motion. Neck supple.  No bruit or thyromegaly  Cardiovascular: Normal rate, regular rhythm, normal heart sounds and intact distal pulses.    Pulmonary/Chest: Breath sounds normal. No respiratory distress.  Abdominal: Soft. Bowel sounds are normal. No distension. No tenderness. No rebound or guarding.   Musculoskeletal: Normal range of motion. No edema or tenderness.   Neurological: Alert and orientated to person, place, and time. No observable focal neuro deficit.  No deficits.  Ambulates great strength and sensation intact throughout.  Skin: Warm and dry. No rash noted. Not diaphoretic.      Emergency Department Course     Imaging:  CT Head w/o Contrast   Final Result   IMPRESSION: No acute intracranial abnormality.      JUANITA MORAN MD            SYSTEM ID:  [id number]        Report per radiology    Laboratory:  Labs Ordered and Resulted from Time of ED Arrival to Time of ED Departure   CBC WITH PLATELETS AND DIFFERENTIAL - Abnormal       Result Value    WBC Count 3.5 (*)     RBC Count 4.47      Hemoglobin 13.9      Hematocrit 43.1      MCV 96      MCH 31.1      MCHC 32.3      RDW 12.8      Platelet Count 260      % Neutrophils 60      % Lymphocytes 27      % Monocytes 11      % Eosinophils 1      % Basophils 1      % Immature Granulocytes 0      NRBCs per 100 WBC 0      Absolute Neutrophils 2.1      Absolute Lymphocytes 0.9      Absolute Monocytes 0.4      Absolute Eosinophils 0.0      Absolute Basophils 0.0      Absolute Immature Granulocytes 0.0      Absolute NRBCs 0.0     BASIC METABOLIC PANEL - Normal    Sodium 139      Potassium 3.8      Chloride 105      Carbon Dioxide (CO2) 30      Anion Gap 4      Urea Nitrogen 15      Creatinine 0.80      Calcium 9.1      Glucose 91      GFR Estimate >90     INFLUENZA A/B & SARS-COV2 PCR MULTIPLEX        Procedures    Emergency Department Course:       Reviewed:  I reviewed nursing notes, vitals and past medical history    Assessments:  1321 I obtained history and examined the patient as noted above.    I rechecked the patient and explained findings.     Interventions:  Medications   0.9% sodium chloride  BOLUS (0 mLs Intravenous Stopped 5/10/22 1520)     Followed by   sodium chloride 0.9% infusion (has no administration in time range)   prochlorperazine (COMPAZINE) injection 5 mg (5 mg Intravenous Given 5/10/22 1421)   diphenhydrAMINE (BENADRYL) injection 12.5 mg (12.5 mg Intravenous Given 5/10/22 1421)      Disposition:  The patient was discharged to home.     Impression & Plan     CMS Diagnoses: None    Medical Decision Making:  Headache   No history of fever, rash, back pain over kidneys, motor weakness, confusion, or sick contacts within the home - no other family members with same headache and headache does not improve outside the house. No history of cancer or acute change in behavior. No history of trauma or anticoagulation.    Broad DDx considered. This is not the worst headache of patient's life, CT negative making space occupying lesion or pseudotumor cerebri unlikely, characteristics not consistent with SAH, no family members with same symptoms and symptoms do not resolve with location making CO poisoning unlikely. Qualities of the headache not consistent with temporal arteritis. Patient is currently afebrile with no meningismus and normal mental status making meningitis or encephalitis unlikely. Completely normal neuro exam. Patient's pain improved with treatment in the ED and was reassessed multiple times throughout their stay and remained stable. He wants to go home.   I feel it is reasonable as there is no meningismus and the patient is non-toxic appearing and the headache was not acute in onset, different in nature, or worst of life.  Patient needs to f/u with pcp to evaluate whether or not abortive treatment and preventative measures will help prevent patient from needing future ED visits. Patient was told if any neuro symptoms develop or a fever or rash, it is important to come back to ED immediately and not wait for appointment with primary care.    Covid testing and influenza for my chart follow  up.    All questions and concerns were answered. The patient was discharged home and recommended to follow up with his primary physician and return with any new or worsening symptoms.       Diagnosis:    ICD-10-CM    1. Nonintractable headache, unspecified chronicity pattern, unspecified headache type  R51.9        Discharge Medications:  Discharge Medication List as of 5/10/2022  3:20 PM          Scribe Disclosure:  I, Treasure Coy, am serving as a scribe at 1:21 PM on 5/10/2022 to document services personally performed by Reji Ruvalcaba MD based on my observations and the provider's statements to me.              Reji Ruvalcaba MD  05/10/22 1521

## 2022-05-10 NOTE — ED NOTES
Patient discharged home . Vital signs stable at discharge. Education provided regarding avs reviewed. Pt verbalized understanding. IV removed. Catheter intact. All questions answered.

## 2022-05-10 NOTE — ED TRIAGE NOTES
Patient presents to the ED reporting intermittent headaches for the past few weeks. Denies history of migraines. States saw chiropractor for some muscle tension in the neck, but continues to have headaches.

## 2022-10-09 ENCOUNTER — HEALTH MAINTENANCE LETTER (OUTPATIENT)
Age: 52
End: 2022-10-09

## 2022-11-26 ENCOUNTER — HEALTH MAINTENANCE LETTER (OUTPATIENT)
Age: 52
End: 2022-11-26

## 2023-12-15 ENCOUNTER — HOSPITAL ENCOUNTER (EMERGENCY)
Facility: CLINIC | Age: 53
Discharge: HOME | End: 2023-12-15
Attending: EMERGENCY MEDICINE | Admitting: EMERGENCY MEDICINE
Payer: COMMERCIAL

## 2023-12-15 ENCOUNTER — APPOINTMENT (OUTPATIENT)
Dept: ULTRASOUND IMAGING | Facility: CLINIC | Age: 53
End: 2023-12-15
Attending: EMERGENCY MEDICINE
Payer: COMMERCIAL

## 2023-12-15 VITALS
TEMPERATURE: 97.7 F | HEART RATE: 73 BPM | OXYGEN SATURATION: 99 % | SYSTOLIC BLOOD PRESSURE: 141 MMHG | RESPIRATION RATE: 16 BRPM | DIASTOLIC BLOOD PRESSURE: 78 MMHG | BODY MASS INDEX: 26.72 KG/M2 | WEIGHT: 191.58 LBS

## 2023-12-15 DIAGNOSIS — R10.11 RUQ ABDOMINAL PAIN: ICD-10-CM

## 2023-12-15 LAB
ALBUMIN SERPL BCG-MCNC: 4.7 G/DL (ref 3.5–5.2)
ALBUMIN UR-MCNC: NEGATIVE MG/DL
ALP SERPL-CCNC: 58 U/L (ref 40–150)
ALT SERPL W P-5'-P-CCNC: 19 U/L (ref 0–70)
ANION GAP SERPL CALCULATED.3IONS-SCNC: 11 MMOL/L (ref 7–15)
APPEARANCE UR: CLEAR
AST SERPL W P-5'-P-CCNC: 26 U/L (ref 0–45)
BASOPHILS # BLD AUTO: 0.1 10E3/UL (ref 0–0.2)
BASOPHILS NFR BLD AUTO: 1 %
BILIRUB SERPL-MCNC: 0.9 MG/DL
BILIRUB UR QL STRIP: NEGATIVE
BUN SERPL-MCNC: 16.9 MG/DL (ref 6–20)
CALCIUM SERPL-MCNC: 9.3 MG/DL (ref 8.6–10)
CHLORIDE SERPL-SCNC: 100 MMOL/L (ref 98–107)
COLOR UR AUTO: YELLOW
CREAT SERPL-MCNC: 1.1 MG/DL (ref 0.67–1.17)
DEPRECATED HCO3 PLAS-SCNC: 28 MMOL/L (ref 22–29)
EGFRCR SERPLBLD CKD-EPI 2021: 81 ML/MIN/1.73M2
EOSINOPHIL # BLD AUTO: 0.1 10E3/UL (ref 0–0.7)
EOSINOPHIL NFR BLD AUTO: 2 %
ERYTHROCYTE [DISTWIDTH] IN BLOOD BY AUTOMATED COUNT: 13 % (ref 10–15)
GLUCOSE SERPL-MCNC: 97 MG/DL (ref 70–99)
GLUCOSE UR STRIP-MCNC: NEGATIVE MG/DL
HCT VFR BLD AUTO: 40.5 % (ref 40–53)
HGB BLD-MCNC: 13.6 G/DL (ref 13.3–17.7)
HGB UR QL STRIP: NEGATIVE
HOLD SPECIMEN: NORMAL
HOLD SPECIMEN: NORMAL
IMM GRANULOCYTES # BLD: 0 10E3/UL
IMM GRANULOCYTES NFR BLD: 0 %
KETONES UR STRIP-MCNC: 10 MG/DL
LEUKOCYTE ESTERASE UR QL STRIP: NEGATIVE
LIPASE SERPL-CCNC: 46 U/L (ref 13–60)
LYMPHOCYTES # BLD AUTO: 1.6 10E3/UL (ref 0.8–5.3)
LYMPHOCYTES NFR BLD AUTO: 38 %
MCH RBC QN AUTO: 31.9 PG (ref 26.5–33)
MCHC RBC AUTO-ENTMCNC: 33.6 G/DL (ref 31.5–36.5)
MCV RBC AUTO: 95 FL (ref 78–100)
MONOCYTES # BLD AUTO: 0.5 10E3/UL (ref 0–1.3)
MONOCYTES NFR BLD AUTO: 11 %
MUCOUS THREADS #/AREA URNS LPF: PRESENT /LPF
NEUTROPHILS # BLD AUTO: 2 10E3/UL (ref 1.6–8.3)
NEUTROPHILS NFR BLD AUTO: 48 %
NITRATE UR QL: NEGATIVE
NRBC # BLD AUTO: 0 10E3/UL
NRBC BLD AUTO-RTO: 0 /100
PH UR STRIP: 5.5 [PH] (ref 5–7)
PLATELET # BLD AUTO: 249 10E3/UL (ref 150–450)
POTASSIUM SERPL-SCNC: 3.7 MMOL/L (ref 3.4–5.3)
PROT SERPL-MCNC: 7.4 G/DL (ref 6.4–8.3)
RBC # BLD AUTO: 4.26 10E6/UL (ref 4.4–5.9)
RBC URINE: 2 /HPF
SODIUM SERPL-SCNC: 139 MMOL/L (ref 135–145)
SP GR UR STRIP: 1.02 (ref 1–1.03)
UROBILINOGEN UR STRIP-MCNC: NORMAL MG/DL
WBC # BLD AUTO: 4.2 10E3/UL (ref 4–11)
WBC URINE: 0 /HPF

## 2023-12-15 PROCEDURE — 85025 COMPLETE CBC W/AUTO DIFF WBC: CPT | Performed by: EMERGENCY MEDICINE

## 2023-12-15 PROCEDURE — 99284 EMERGENCY DEPT VISIT MOD MDM: CPT | Mod: 25

## 2023-12-15 PROCEDURE — 81001 URINALYSIS AUTO W/SCOPE: CPT | Performed by: EMERGENCY MEDICINE

## 2023-12-15 PROCEDURE — 76705 ECHO EXAM OF ABDOMEN: CPT

## 2023-12-15 PROCEDURE — 83690 ASSAY OF LIPASE: CPT | Performed by: EMERGENCY MEDICINE

## 2023-12-15 PROCEDURE — 250N000013 HC RX MED GY IP 250 OP 250 PS 637: Performed by: EMERGENCY MEDICINE

## 2023-12-15 PROCEDURE — 80053 COMPREHEN METABOLIC PANEL: CPT | Performed by: EMERGENCY MEDICINE

## 2023-12-15 PROCEDURE — 36415 COLL VENOUS BLD VENIPUNCTURE: CPT | Performed by: EMERGENCY MEDICINE

## 2023-12-15 RX ORDER — MAGNESIUM HYDROXIDE/ALUMINUM HYDROXICE/SIMETHICONE 120; 1200; 1200 MG/30ML; MG/30ML; MG/30ML
15 SUSPENSION ORAL ONCE
Status: COMPLETED | OUTPATIENT
Start: 2023-12-15 | End: 2023-12-15

## 2023-12-15 RX ORDER — SUCRALFATE 1 G/1
1 TABLET ORAL 4 TIMES DAILY
Qty: 56 TABLET | Refills: 0 | Status: SHIPPED | OUTPATIENT
Start: 2023-12-15 | End: 2023-12-29

## 2023-12-15 RX ORDER — PANTOPRAZOLE SODIUM 40 MG/1
40 TABLET, DELAYED RELEASE ORAL ONCE
Status: COMPLETED | OUTPATIENT
Start: 2023-12-15 | End: 2023-12-15

## 2023-12-15 RX ADMIN — PANTOPRAZOLE SODIUM 40 MG: 40 TABLET, DELAYED RELEASE ORAL at 11:26

## 2023-12-15 RX ADMIN — ALUMINUM HYDROXIDE, MAGNESIUM HYDROXIDE, AND DIMETHICONE 15 ML: 200; 20; 200 SUSPENSION ORAL at 11:26

## 2023-12-15 ASSESSMENT — ACTIVITIES OF DAILY LIVING (ADL): ADLS_ACUITY_SCORE: 35

## 2023-12-15 NOTE — ED PROVIDER NOTES
History     Chief Complaint:  Abdominal pain and nausea      The history is provided by the patient.      Sherif Moraes is a 53 year old male who presents with RUQ pain and nausea. Pt has had nausea over the past few weeks. Yesterday had onset of right sided abdominal pain that radiates to the back. Today lingering pain in the back. No vomiting or diarrhea. No major nausea today. Stool is not black or bloody. Minimal etoh use. No Nsaid use. Pt has had previous EGD that revealed gastric ulcer a few years ago. No prior abdominal surgery. NO fever. NO dysuria or hematuria.     Independent Historian:   None - Patient Only      Medications:    Valium   Vistaril   Robaxin    Past Medical History:    Anxiety     Physical Exam   Patient Vitals for the past 24 hrs:   BP Temp Temp src Pulse Resp SpO2 Weight   12/15/23 0957 (!) 138/90 97.7  F (36.5  C) Oral 65 18 100 % 86.9 kg (191 lb 9.3 oz)        Physical Exam  VS: Reviewed per above  HENT: Mucous membranes moist  EYES: sclera anicteric  CV: Rate as noted,  regular rhythm.   RESP: Effort normal. Breath sounds are normal bilaterally.  GI: no tenderness/rebound/guarding, not distended.  NEURO: Alert, moving all extremities  MSK: No deformity of the extremities  SKIN: Warm and dry      Emergency Department Course     Imaging:  US Abdomen Limited   Final Result   IMPRESSION:   1.  Unremarkable right upper quadrant abdominal ultrasound without   visualized explanation for patient's symptoms.      RACHID WINSLOW MD            SYSTEM ID:  [id number]      Report per radiology.        Laboratory:  Labs Ordered and Resulted from Time of ED Arrival to Time of ED Departure   ROUTINE UA WITH MICROSCOPIC REFLEX TO CULTURE - Abnormal       Result Value    Color Urine Yellow      Appearance Urine Clear      Glucose Urine Negative      Bilirubin Urine Negative      Ketones Urine 10 (*)     Specific Gravity Urine 1.018      Blood Urine Negative      pH Urine 5.5      Protein Albumin  Urine Negative      Urobilinogen Urine Normal      Nitrite Urine Negative      Leukocyte Esterase Urine Negative      Mucus Urine Present (*)     RBC Urine 2      WBC Urine 0     CBC WITH PLATELETS AND DIFFERENTIAL - Abnormal    WBC Count 4.2      RBC Count 4.26 (*)     Hemoglobin 13.6      Hematocrit 40.5      MCV 95      MCH 31.9      MCHC 33.6      RDW 13.0      Platelet Count 249      % Neutrophils 48      % Lymphocytes 38      % Monocytes 11      % Eosinophils 2      % Basophils 1      % Immature Granulocytes 0      NRBCs per 100 WBC 0      Absolute Neutrophils 2.0      Absolute Lymphocytes 1.6      Absolute Monocytes 0.5      Absolute Eosinophils 0.1      Absolute Basophils 0.1      Absolute Immature Granulocytes 0.0      Absolute NRBCs 0.0     COMPREHENSIVE METABOLIC PANEL - Normal    Sodium 139      Potassium 3.7      Carbon Dioxide (CO2) 28      Anion Gap 11      Urea Nitrogen 16.9      Creatinine 1.10      GFR Estimate 81      Calcium 9.3      Chloride 100      Glucose 97      Alkaline Phosphatase 58      AST 26      ALT 19      Protein Total 7.4      Albumin 4.7      Bilirubin Total 0.9     LIPASE - Normal    Lipase 46        Emergency Department Course & Assessments:         Interventions:  Medications   pantoprazole (PROTONIX) EC tablet 40 mg (40 mg Oral $Given 12/15/23 1126)   alum & mag hydroxide-simethicone (MAALOX) suspension 15 mL (15 mLs Oral $Given 12/15/23 1126)        Assessments:  1111 I obtained history and examined the patient as noted above.   1315 I rechecked the patient and explained findings. We discussed plan for discharge and patient is in agreement with plan.        Consultations/Discussion of Management or Tests:  None        Disposition:  The patient was discharged to home.     Impression & Plan    CMS Diagnoses: None    Medical Decision Making:  Patient presents to the ER for evaluation of right upper quadrant abdominal pain and nausea over the past few weeks intermittently.   Abdominal exam is benign without reproducible tenderness.  History and urinalysis not supportive of UTI or renal colic.  No evidence of pancreatitis or transaminitis.  Right upper quadrant ultrasound without evidence of biliary pathology.  Considered occult peptic ulcer disease.  Lower suspicion for other sinister intra-abdominal pathology meriting CT imaging.  Plan for PPI, Carafate, follow-up in primary care.  Return precautions discussed.      Diagnosis:    ICD-10-CM    1. RUQ abdominal pain  R10.11            Discharge Medications:  New Prescriptions    OMEPRAZOLE (PRILOSEC) 20 MG DR CAPSULE    Take 2 capsules (40 mg) by mouth daily for 30 days    SUCRALFATE (CARAFATE) 1 GM TABLET    Take 1 tablet (1 g) by mouth 4 times daily for 14 days      Scribe Disclosure:  IKylie, am serving as a scribe at 11:19 AM on 12/15/2023 to document services personally performed by Joe Bennett MD based on my observations and the provider's statements to me.     12/15/2023   Joe Bennett MD Lindenbaum, Elan, MD  12/15/23 4106

## 2023-12-15 NOTE — ED TRIAGE NOTES
Pt c/o RLQ abdominal pain that radiates to his back X3-4 days. Denies urinary symptoms. +Nausea, no vomiting. Denies abdominal surgery history. Reports HX gastric ulcer. Denies blood in stool. A&OX4.      Triage Assessment (Adult)       Row Name 12/15/23 0959          Triage Assessment    Airway WDL WDL        Respiratory WDL    Respiratory WDL WDL        Skin Circulation/Temperature WDL    Skin Circulation/Temperature WDL WDL        Cardiac WDL    Cardiac WDL WDL        Peripheral/Neurovascular WDL    Peripheral Neurovascular WDL WDL

## 2024-10-12 ENCOUNTER — HEALTH MAINTENANCE LETTER (OUTPATIENT)
Age: 54
End: 2024-10-12